# Patient Record
Sex: MALE | Race: WHITE
[De-identification: names, ages, dates, MRNs, and addresses within clinical notes are randomized per-mention and may not be internally consistent; named-entity substitution may affect disease eponyms.]

---

## 2021-02-11 ENCOUNTER — HOSPITAL ENCOUNTER (EMERGENCY)
Dept: HOSPITAL 33 - ED | Age: 61
Discharge: HOME | End: 2021-02-11
Payer: COMMERCIAL

## 2021-02-11 VITALS — SYSTOLIC BLOOD PRESSURE: 147 MMHG | HEART RATE: 66 BPM | DIASTOLIC BLOOD PRESSURE: 95 MMHG

## 2021-02-11 VITALS — OXYGEN SATURATION: 96 %

## 2021-02-11 DIAGNOSIS — I25.10: ICD-10-CM

## 2021-02-11 DIAGNOSIS — Z79.899: ICD-10-CM

## 2021-02-11 DIAGNOSIS — E78.5: ICD-10-CM

## 2021-02-11 DIAGNOSIS — I25.2: ICD-10-CM

## 2021-02-11 DIAGNOSIS — R55: Primary | ICD-10-CM

## 2021-02-11 DIAGNOSIS — I10: ICD-10-CM

## 2021-02-11 DIAGNOSIS — F41.9: ICD-10-CM

## 2021-02-11 LAB
ALBUMIN SERPL-MCNC: 3.9 G/DL (ref 3.5–5)
ALP SERPL-CCNC: 87 U/L (ref 38–126)
ALT SERPL-CCNC: 28 U/L (ref 0–50)
ANION GAP SERPL CALC-SCNC: 12.8 MEQ/L (ref 5–15)
AST SERPL QL: 24 U/L (ref 17–59)
BASOPHILS # BLD AUTO: 0.07 10*3/UL (ref 0–0.4)
BASOPHILS NFR BLD AUTO: 1 % (ref 0–0.4)
BILIRUB BLD-MCNC: 0.6 MG/DL (ref 0.2–1.3)
BUN SERPL-MCNC: 20 MG/DL (ref 9–20)
CALCIUM SPEC-MCNC: 9.5 MG/DL (ref 8.4–10.2)
CHLORIDE SERPL-SCNC: 102 MMOL/L (ref 98–107)
CO2 SERPL-SCNC: 25 MMOL/L (ref 22–30)
CREAT SERPL-MCNC: 0.6 MG/DL (ref 0.66–1.25)
EOSINOPHIL # BLD AUTO: 0.21 10*3/UL (ref 0–0.5)
GFR SERPLBLD BASED ON 1.73 SQ M-ARVRAT: > 60 ML/MIN
GLUCOSE SERPL-MCNC: 120 MG/DL (ref 74–106)
GLUCOSE UR-MCNC: NEGATIVE MG/DL
HCT VFR BLD AUTO: 38.5 % (ref 42–50)
HGB BLD-MCNC: 12.4 GM/DL (ref 12.5–18)
LYMPHOCYTES # SPEC AUTO: 1.48 10*3/UL (ref 1–4.6)
MAGNESIUM SERPL-MCNC: 2.1 MG/DL (ref 1.6–2.3)
MCH RBC QN AUTO: 31 PG (ref 26–32)
MCHC RBC AUTO-ENTMCNC: 32.2 G/DL (ref 32–36)
MONOCYTES # BLD AUTO: 0.49 10*3/UL (ref 0–1.3)
PLATELET # BLD AUTO: 371 K/MM3 (ref 150–450)
POTASSIUM SERPLBLD-SCNC: 3.9 MMOL/L (ref 3.5–5.1)
PROT SERPL-MCNC: 6.7 G/DL (ref 6.3–8.2)
PROT UR STRIP-MCNC: NEGATIVE MG/DL
RBC # BLD AUTO: 4 M/MM3 (ref 4.1–5.6)
RBC #/AREA URNS HPF: (no result) /HPF (ref 0–2)
SODIUM SERPL-SCNC: 137 MMOL/L (ref 137–145)
WBC # BLD AUTO: 7.2 K/MM3 (ref 4–10.5)
WBC #/AREA URNS HPF: (no result) /HPF (ref 0–5)

## 2021-02-11 PROCEDURE — 84484 ASSAY OF TROPONIN QUANT: CPT

## 2021-02-11 PROCEDURE — 85025 COMPLETE CBC W/AUTO DIFF WBC: CPT

## 2021-02-11 PROCEDURE — 81001 URINALYSIS AUTO W/SCOPE: CPT

## 2021-02-11 PROCEDURE — 36415 COLL VENOUS BLD VENIPUNCTURE: CPT

## 2021-02-11 PROCEDURE — 71045 X-RAY EXAM CHEST 1 VIEW: CPT

## 2021-02-11 PROCEDURE — 93041 RHYTHM ECG TRACING: CPT

## 2021-02-11 PROCEDURE — 96360 HYDRATION IV INFUSION INIT: CPT

## 2021-02-11 PROCEDURE — 93005 ELECTROCARDIOGRAM TRACING: CPT

## 2021-02-11 PROCEDURE — 99284 EMERGENCY DEPT VISIT MOD MDM: CPT

## 2021-02-11 PROCEDURE — 80053 COMPREHEN METABOLIC PANEL: CPT

## 2021-02-11 PROCEDURE — 96361 HYDRATE IV INFUSION ADD-ON: CPT

## 2021-02-11 PROCEDURE — 83735 ASSAY OF MAGNESIUM: CPT

## 2021-02-11 PROCEDURE — 36000 PLACE NEEDLE IN VEIN: CPT

## 2021-02-11 PROCEDURE — 94760 N-INVAS EAR/PLS OXIMETRY 1: CPT

## 2021-02-11 NOTE — ERPHSYRPT
- History of Present Illness


Time Seen by Provider: 02/11/21 10:59


Source: patient


Patient Subjective Stated Complaint: .


Triage Nursing Assessment: .


Physician History: 





Patient is a 61-year-old male presents to our ED from our physical therapy d

epartment.  Patient is status post right total knee replacement.  He was 

undergoing flexion range of motion exercises of his surgical knee when symptoms 

occurred.  However just prior to the episode patient was advised that his range 

of motion did not improve he would have to go into surgery for surgical 

manipulation of his knee.  At that point patient began to feel lightheaded as he

was concerned for the possibility of surgery.  Patient states that emotional 

stress can sometimes trigger a syncopal episode.  Patient began to feel 

lightheaded.  When he awoke staff was around him.  Per report patient was 

unconscious for about 15 seconds.  Upon awakening patient was at his baseline 

neurologically.  No associated nausea or vomiting.  No chest pain or shortness 

of breath.  Patient states he has had syncopal episodes in the past.  He 

believes a stress from physical therapy caused him to syncopized.  He is 

currently asymptomatic.  Vitals are stable.  Patient voices no other complaints 

or concerns at this time.


Witnessed: bystander


Prior Episodes: single episode today


Timing/Duration: other (Symptoms/syncope lasted 15 seconds.)


Precipitating Factors: other (Patient was undergoing physical therapy to his 

right knee and was in severe pain when symptoms occurred.)


Loss of Consciousness: brief (seconds)


Charcter of event(s): became unresponsive


Allergies/Adverse Reactions: 








morphine Adverse Reaction (Verified 02/11/21 11:06)


   


Sulfa (Sulfonamide Antibiotics) Adverse Reaction (Verified 02/11/21 11:06)


   





Home Medications: 








Aspirin 81 mg PO DAILY 02/11/21 [History]


Chlorthalidone 25 mg PO DAILY 02/11/21 [History]


Losartan Potassium 100 mg PO DAILY 02/11/21 [History]


Metoprolol Succinate 25 mg PO DAILY 02/11/21 [History]


Omeprazole 40 mg PO DAILY 02/11/21 [History]


Sertraline HCl 100 mg PO DAILY 02/11/21 [History]





Hx Tetanus, Diphtheria Vaccination/Date Given: No


Hx Influenza Vaccination/Date Given: No


Hx Pneumococcal Vaccination/Date Given: No


Immunizations Up to Date: No





Travel Risk





- International Travel


Have you traveled outside of the country in past 3 weeks: No





- Coronavirus Screening


Are you exhibiting any of the following symptoms?: No


Close contact with a COVID-19 positive Pt in past 14-21 Days: No





- Past Medical History


Pertinent Past Medical History: Yes


Neurological History: No Pertinent History


Cardiac History: Coronary Artery Disease, High Cholesterol, Hypertension, 

Myocardial Infarction (MI)


Respiratory History: No Pertinent History, Sleep Apnea


Endocrine Medical History: No Pertinent History


Musculoskeletal History: Osteoarthritis


Psycho-Social History: Anxiety


Other Medical History: PATIENT REPORTS OSTEOARTHRITIS THROUGHOUT ESPECIALLY 

HANDS AND LEFT FOOT. ACL REPAIR LEFT IN THE 90S. LEFT CARPEL TUNNEL RELEASE 

LEFT.  CANCER LARYNX 2012 WITH SURGERY AND RADIATION 2013.  HX ANXIETY, sleep 

apnea wears cpap





- Past Surgical History


Past Surgical History: Yes


Cardiac: Cardiac Catheterization


Gastrointestinal: Hernia Repair


Musculoskeletal: Orthopedic Surgery


Other Surgical History: right knee x4, acl left knee, carpal tunnel,





- Social History


Smoking Status: Former smoker


Exposure to second hand smoke: No


Drug Use: none


Patient Lives Alone: No





- Review of Systems


Constitutional: No Symptoms, No Fever, No Chills


Eyes: No Symptoms


Ears, Nose, & Throat: No Symptoms


Respiratory: No Symptoms, No Cough, No Dyspnea


Cardiac: No Symptoms, No Chest Pain, No Edema, No Syncope


Abdominal/Gastrointestinal: No Symptoms, No Abdominal Pain, No Nausea, No 

Vomiting, No Diarrhea


Genitourinary Symptoms: No Symptoms, No Dysuria


Musculoskeletal: No Symptoms, No Back Pain, No Neck Pain


Skin: No Symptoms, No Rash


Neurological: No Symptoms, No Dizziness, No Focal Weakness, No Sensory Changes


Psychological: No Symptoms


Endocrine: No Symptoms


Hematologic/Lymphatic: No Symptoms


Immunological/Allergic: No Symptoms


All Other Systems: Reviewed and Negative





Physical Exam





- Nursing Vital Signs


Nursing Vital Signs: 


                               Initial Vital Signs











Pulse Rate  69   02/11/21 10:56


 


Respiratory Rate  22   02/11/21 10:56


 


Blood Pressure  118/88   02/11/21 10:56


 


O2 Sat by Pulse Oximetry  97   02/11/21 10:56








                                   Pain Scale











Pain Intensity                 0

















- Beryl Coma Scale


Best Eye Response (Lima): (4) open spontaneously


Best Verbal Response (Lima): (5) oriented


Best Motor Response (Beryl): (6) obeys commands


Beryl Total: 15





- Physical Exam


General Appearance: no apparent distress, alert


Eye Exam: bilateral eye: normal inspection, PERRL, EOMI


Ears, Nose, Throat Exam: normal ENT inspection, pharynx normal, moist mucous 

membranes


Neck Exam: normal inspection, non-tender, supple, full range of motion


Respiratory: normal breath sounds, lungs clear, No chest tenderness, No 

respiratory distress


Cardiovascular: regular rate/rhythm, capillary refill <2 sec, No murmur, No 

pulse deficit


Gastrointestinal: soft, No tenderness, No distention, No mass


Back Exam: normal inspection, normal range of motion, No CVA tenderness, No 

vertebral tenderness


Extremity Exam: normal inspection, normal range of motion, pelvis stable, No 

tenderness


Peripheral Pulses: femoral (R): 2+, femoral (L): 2+, dorsalis-pedis (R): 2+, 

dorsalis-pedis (L): 2+


Mental Status: alert, oriented x 3, cooperative, No agitated, No uncooperative


CNs Exam: normal hearing, normal speech, PERRL, No abnormal eye position, No 

facial droop


Coordination/Gait: normal finger to nose, normal cerebellar function


Motor/Sensory: no motor deficit, no sensory deficit, no pronator drift


Skin Exam: normal color, warm, dry, No rash


**SpO2 Interpretation**: normal


SpO2: 96


O2 Delivery: Room Air





- Course


Nursing assessment & vital signs reviewed: Yes


EKG Interpreted by Me: RATE ([id number])


Ordered Tests: 


                               Active Orders 24 hr











 Category Date Time Status


 


 Cardiac Monitor STAT Care  02/11/21 11:01 Active


 


 EKG-ER Only STAT Care  02/11/21 11:01 Active


 


 IV Insertion STAT Care  02/11/21 11:01 Active


 


 Pulse Oximetry (ED) STAT Care  02/11/21 11:01 Active


 


 CHEST 1 VIEW (PORTABLE) Stat Exams  02/11/21 11:01 Completed


 


 CBC W DIFF Stat Lab  02/11/21 11:01 Completed


 


 CMP Stat Lab  02/11/21 11:15 Completed


 


 MAGNESIUM Stat Lab  02/11/21 11:15 Completed


 


 TROPONIN Q3H Lab  02/11/21 11:15 Completed


 


 TROPONIN Q3H Lab  02/11/21 14:18 Completed


 


 TROPONIN Q3H Lab  02/11/21 17:15 Ordered


 


 TROPONIN Q3H Lab  02/11/21 20:15 Ordered


 


 TROPONIN Q3H Lab  02/11/21 23:15 Ordered


 


 UA W/RFX UR CULTURE Stat Lab  02/11/21 11:01 Completed








Medication Summary











Generic Name Dose Route Start Last Admin





  Trade Name Freq  PRN Reason Stop Dose Admin


 


Sodium Chloride  1,000 mls @ 100 mls/hr  02/11/21 11:15  02/11/21 11:35





  Sodium Chloride 0.9% 1000 Ml  IV  03/13/21 11:14  100 mls/hr





  .Q10H CHAITANYA   Administration











Lab/Rad Data: 


                           Laboratory Result Diagrams





                                 02/11/21 11:01 





                                 02/11/21 11:15 





                               Laboratory Results











  02/11/21 02/11/21 02/11/21 Range/Units





  14:18 11:15 11:15 


 


WBC     (4.0-10.5)  K/mm3


 


RBC     (4.1-5.6)  M/mm3


 


Hgb     (12.5-18.0)  gm/dl


 


Hct     (42-50)  %


 


MCV     ()  fl


 


MCH     (26-32)  pg


 


MCHC     (32-36)  g/dl


 


RDW     (11.5-14.0)  %


 


Plt Count     (150-450)  K/mm3


 


MPV     (7.5-11.0)  fl


 


Gran %     (36.0-66.0)  %


 


Eos # (Auto)     (0-0.5)  


 


Absolute Lymphs (auto)     (1.0-4.6)  


 


Absolute Monos (auto)     (0.0-1.3)  


 


Lymphocytes %     (24.0-44.0)  %


 


Monocytes %     (0.0-12.0)  %


 


Eosinophils %     (0.00-5.0)  %


 


Basophils %     (0.0-0.4)  %


 


Absolute Granulocytes     (1.4-6.9)  


 


Basophils #     (0-0.4)  


 


Sodium    137  (137-145)  mmol/L


 


Potassium    3.9  (3.5-5.1)  mmol/L


 


Chloride    102  ()  mmol/L


 


Carbon Dioxide    25  (22-30)  mmol/L


 


Anion Gap    12.8  (5-15)  MEQ/L


 


BUN    20  (9-20)  mg/dL


 


Creatinine    0.60 L  (0.66-1.25)  mg/dL


 


Estimated GFR    > 60.0  ML/MIN


 


Glucose    120 H  ()  mg/dL


 


Calcium    9.5  (8.4-10.2)  mg/dL


 


Magnesium    2.1  (1.6-2.3)  mg/dL


 


Total Bilirubin    0.60  (0.2-1.3)  mg/dL


 


AST    24  (17-59)  U/L


 


ALT    28  (0-50)  U/L


 


Alkaline Phosphatase    87  ()  U/L


 


Troponin I  < 0.012  < 0.012   (0.000-0.034)  ng/mL


 


Serum Total Protein    6.7  (6.3-8.2)  g/dL


 


Albumin    3.9  (3.5-5.0)  g/dL


 


Urine Color     (YELLOW)  


 


Urine Appearance     (CLEAR)  


 


Urine pH     (5-6)  


 


Ur Specific Gravity     (1.005-1.025)  


 


Urine Protein     (Negative)  


 


Urine Ketones     (NEGATIVE)  


 


Urine Blood     (0-5)  Ranulfo/ul


 


Urine Nitrite     (NEGATIVE)  


 


Urine Bilirubin     (NEGATIVE)  


 


Urine Urobilinogen     (0-1)  mg/dL


 


Ur Leukocyte Esterase     (NEGATIVE)  


 


Urine WBC (Auto)     (0-5)  /HPF


 


Urine RBC (Auto)     (0-2)  /HPF


 


U Epithel Cells (Auto)     (FEW)  /HPF


 


Urine Bacteria (Auto)     (NEGATIVE)  /HPF


 


Granular Casts (Auto)     (NEGATIVE)  /LPF


 


Urine Mucus (Auto)     (NEGATIVE)  /HPF


 


Urine Culture Reflexed     (NO)  


 


Urine Glucose     (NEGATIVE)  mg/dL














  02/11/21 02/11/21 Range/Units





  11:01 11:01 


 


WBC  7.2   (4.0-10.5)  K/mm3


 


RBC  4.00 L   (4.1-5.6)  M/mm3


 


Hgb  12.4 L   (12.5-18.0)  gm/dl


 


Hct  38.5 L   (42-50)  %


 


MCV  96.3   ()  fl


 


MCH  31.0   (26-32)  pg


 


MCHC  32.2   (32-36)  g/dl


 


RDW  12.8   (11.5-14.0)  %


 


Plt Count  371   (150-450)  K/mm3


 


MPV  9.6   (7.5-11.0)  fl


 


Gran %  68.9 H   (36.0-66.0)  %


 


Eos # (Auto)  0.21   (0-0.5)  


 


Absolute Lymphs (auto)  1.48   (1.0-4.6)  


 


Absolute Monos (auto)  0.49   (0.0-1.3)  


 


Lymphocytes %  20.4 L   (24.0-44.0)  %


 


Monocytes %  6.8   (0.0-12.0)  %


 


Eosinophils %  2.9   (0.00-5.0)  %


 


Basophils %  1.0   (0.0-0.4)  %


 


Absolute Granulocytes  4.99   (1.4-6.9)  


 


Basophils #  0.07   (0-0.4)  


 


Sodium    (137-145)  mmol/L


 


Potassium    (3.5-5.1)  mmol/L


 


Chloride    ()  mmol/L


 


Carbon Dioxide    (22-30)  mmol/L


 


Anion Gap    (5-15)  MEQ/L


 


BUN    (9-20)  mg/dL


 


Creatinine    (0.66-1.25)  mg/dL


 


Estimated GFR    ML/MIN


 


Glucose    ()  mg/dL


 


Calcium    (8.4-10.2)  mg/dL


 


Magnesium    (1.6-2.3)  mg/dL


 


Total Bilirubin    (0.2-1.3)  mg/dL


 


AST    (17-59)  U/L


 


ALT    (0-50)  U/L


 


Alkaline Phosphatase    ()  U/L


 


Troponin I    (0.000-0.034)  ng/mL


 


Serum Total Protein    (6.3-8.2)  g/dL


 


Albumin    (3.5-5.0)  g/dL


 


Urine Color   YELLOW  (YELLOW)  


 


Urine Appearance   SLIGHTLY CLOUDY  (CLEAR)  


 


Urine pH   6.0  (5-6)  


 


Ur Specific Gravity   1.021  (1.005-1.025)  


 


Urine Protein   NEGATIVE  (Negative)  


 


Urine Ketones   NEGATIVE  (NEGATIVE)  


 


Urine Blood   NEGATIVE  (0-5)  Ranulfo/ul


 


Urine Nitrite   NEGATIVE  (NEGATIVE)  


 


Urine Bilirubin   NEGATIVE  (NEGATIVE)  


 


Urine Urobilinogen   2  (0-1)  mg/dL


 


Ur Leukocyte Esterase   NEGATIVE  (NEGATIVE)  


 


Urine WBC (Auto)   NONE  (0-5)  /HPF


 


Urine RBC (Auto)   NONE  (0-2)  /HPF


 


U Epithel Cells (Auto)   NONE  (FEW)  /HPF


 


Urine Bacteria (Auto)   NONE SEEN  (NEGATIVE)  /HPF


 


Granular Casts (Auto)   5-10  (NEGATIVE)  /LPF


 


Urine Mucus (Auto)   SLIGHT  (NEGATIVE)  /HPF


 


Urine Culture Reflexed   NO  (NO)  


 


Urine Glucose   NEGATIVE  (NEGATIVE)  mg/dL














- Progress


Progress: improved


Progress Note: 





02/11/21 14:59


Patient reassessed.  He remains asymptomatic.  Repeat neuro exam within normal 

limits.  Initial neuro exam within normal limits.  NIH score is 0.  Troponin 

negative x2.  Chest x-ray negative.  No indication for further evaluation or 

treatment at this time.  Patient has a history of vasovagal syncope.  It appears

 that today's episode was a recurrence of his usual vasovagal syncope.  Patient 

ambulated in our ED.  He was completely asymptomatic.  Gait was steady and 

normal will discharge at this time.  Patient agrees to follow-up with his 

primary care doctor within 48 hours for reevaluation.  Patient voices no other 

complaints or concerns at this time.


02/11/21 15:02





Counseled pt/family regarding: lab results, diagnosis, need for follow-up, rad 

results





- Departure


Departure Disposition: Home


Clinical Impression: 


 Vasovagal syncope





Condition: Stable


Critical Care Time: No


Referrals: 


HUMA PACHECO [Primary Care Provider] - 


Additional Instructions: 


Discharge/Care Plan





CHAR HERRERA was seen on 02/11/21 in the Emergency Room. The patient was 

counseled regarding Diagnosis,Lab results, Imaging studies, need for follow up 

and when to return to the Emergency Room.





Prescriptions given:





Discharge Note





I have spoken with the patient and/or caregivers. I have explained the patient's

condition, diagnosis and treatment plan based on the information available to me

at this time. I have answered the patient's and/or caregiver's questions and 

addressed any concerns. The patient and/or caregivers have as good understanding

of the patient's diagnosis, condition and treatment plan as can be expected at 

this point. The vital signs have been stable. The patient's condition is stable 

and appropriate for discharge from the emergency department.





The patient will pursue further outpatient evaluation with the primary care 

physician or other designated or consulting physician as outlined in the 

discharge instructions. The patient and/or caregivers are agreeable to this plan

of care and follow-up instructions have been explained in detail. The patient 

and/or caregivers have received these instruction. The patient/and or caregivers

are aware that any significant change in condition or worsening of symptoms 

should prompt an immediate return to this or the closest emergency department or

call 911.

## 2021-02-11 NOTE — XRAY
Indication: Syncope.



Comparison: December 23, 2008.



Portable chest remains hyperinflated and clear.  Heart is not enlarged again

with tortuous descending aorta.  Bony thorax intact again with mild

degenerative changes.



Impression: Continued nonacute hyperinflated chest with chronic features.

## 2022-12-17 ENCOUNTER — HOSPITAL ENCOUNTER (EMERGENCY)
Dept: HOSPITAL 33 - ED | Age: 62
LOS: 1 days | Discharge: HOME | End: 2022-12-18
Payer: COMMERCIAL

## 2022-12-17 DIAGNOSIS — Z79.899: ICD-10-CM

## 2022-12-17 DIAGNOSIS — E78.5: ICD-10-CM

## 2022-12-17 DIAGNOSIS — R09.81: ICD-10-CM

## 2022-12-17 DIAGNOSIS — R05.1: ICD-10-CM

## 2022-12-17 DIAGNOSIS — R53.1: ICD-10-CM

## 2022-12-17 DIAGNOSIS — J10.1: Primary | ICD-10-CM

## 2022-12-17 DIAGNOSIS — R55: ICD-10-CM

## 2022-12-17 DIAGNOSIS — I10: ICD-10-CM

## 2022-12-17 PROCEDURE — 93041 RHYTHM ECG TRACING: CPT

## 2022-12-17 PROCEDURE — 93005 ELECTROCARDIOGRAM TRACING: CPT

## 2022-12-17 PROCEDURE — 81015 MICROSCOPIC EXAM OF URINE: CPT

## 2022-12-17 PROCEDURE — 36000 PLACE NEEDLE IN VEIN: CPT

## 2022-12-17 PROCEDURE — 83605 ASSAY OF LACTIC ACID: CPT

## 2022-12-17 PROCEDURE — 85025 COMPLETE CBC W/AUTO DIFF WBC: CPT

## 2022-12-17 PROCEDURE — 80053 COMPREHEN METABOLIC PANEL: CPT

## 2022-12-17 PROCEDURE — 96360 HYDRATION IV INFUSION INIT: CPT

## 2022-12-17 PROCEDURE — 84484 ASSAY OF TROPONIN QUANT: CPT

## 2022-12-17 PROCEDURE — 83735 ASSAY OF MAGNESIUM: CPT

## 2022-12-17 PROCEDURE — 0241U: CPT

## 2022-12-17 PROCEDURE — 87040 BLOOD CULTURE FOR BACTERIA: CPT

## 2022-12-17 PROCEDURE — 84145 PROCALCITONIN (PCT): CPT

## 2022-12-17 PROCEDURE — 87651 STREP A DNA AMP PROBE: CPT

## 2022-12-17 PROCEDURE — 83880 ASSAY OF NATRIURETIC PEPTIDE: CPT

## 2022-12-17 PROCEDURE — 71045 X-RAY EXAM CHEST 1 VIEW: CPT

## 2022-12-17 PROCEDURE — 70450 CT HEAD/BRAIN W/O DYE: CPT

## 2022-12-17 PROCEDURE — 99284 EMERGENCY DEPT VISIT MOD MDM: CPT

## 2022-12-17 PROCEDURE — 36415 COLL VENOUS BLD VENIPUNCTURE: CPT

## 2022-12-17 NOTE — ERPHSYRPT
- History of Present Illness


Time Seen by Provider: 12/17/22 22:30


Source: patient


Exam Limitations: no limitations


Patient Subjective Stated Complaint: chills, "feeling feverish", cough, scratchy

throat and syncope


Triage Nursing Assessment: pt brought in by ambulance.  Pt c/o "not feeling well

since early this morning around 2am".  Pt c/o chills, "feeling feverish", cough,

scratchy throat, and syncopal episode at home, hit his head on the wall beside 

their sunroom, and had loc for a few seconds.  Pt is able to move all 

extremities without diff,  strong and equal, speech is clear.  Lungs clear,

heart tones regular.  Abd soft with active bs x4 quad, nontender, c/o gerd.  Pt 

denies any c/p, sob.


Physician History: 





62 years old male with history of hypertension, hyperlipidemia presented in the 

ER with chief complaint of flulike symptoms since yesterday with cough 

congestion with generalized weakness fatigue and tiredness.  Subjective feeling 

of fever and chills.  Patient reports he was walking, feeling lightheaded and 

was on his knees and passed out.  He did hit his head against the wall.  No 

seizure-like activity noticed.


Timing/Duration: yesterday, gradual onset, worse


Cough Quality/Degree: mild, moderate, dry cough, productive cough


Possible Cause: unknown cause


Modifying Factors: Worsens With: coughing, exertion


Associated Symptoms: fever, chills, cough, muscle aches, nasal congestion, nasal

drainage, sinus infection, sore throat


Allergies/Adverse Reactions: 








morphine Adverse Reaction (Verified 12/17/22 22:45)


   


Sulfa (Sulfonamide Antibiotics) Adverse Reaction (Verified 12/17/22 22:45)


   





Home Medications: 








Chlorthalidone 12.5 mg PO DAILY 02/11/21 [History]


Losartan Potassium 100 mg PO DAILY 02/11/21 [History]


Metoprolol Succinate 50 mg PO DAILY 02/11/21 [History]


Omeprazole 40 mg PO DAILY 02/11/21 [History]


Sertraline HCl 100 mg PO DAILY 02/11/21 [History]


Amlodipine Besylate 5 mg*** [Norvasc 5 mg***] 5 mg PO HS 12/17/22 [History]


Atorvastatin Calcium 10 mg PO HS 12/17/22 [History]


Celecoxib 100 mg** [celeBREX 100 MG**] 200 mg PO DAILY 12/17/22 [History]


Cetirizine HCl [Zyrtec] 10 mg PO DAILY 12/17/22 [History]





Hx Tetanus, Diphtheria Vaccination/Date Given: No


Hx Influenza Vaccination/Date Given: No


Hx Pneumococcal Vaccination/Date Given: No


Immunizations Up to Date: No





Travel Risk





- International Travel


Have you traveled outside of the country in past 3 weeks: No





- Coronavirus Screening


Are you exhibiting any of the following symptoms?: Yes


Symptoms: Cough: New Onset, Headaches/Body Aches/Fatigue


Close contact with a COVID-19 positive Pt in past 14-21 Days: No





- Vaccine Status


Have you recieved a Covid-19 vaccination: Yes


: Moderna





- Vaccination Dates


Date of 2cond Vaccination (if applicable): .





- Review of Systems


Constitutional: Fever, Chills, Fatigue, Weakness


Eyes: No Symptoms


Ears, Nose, & Throat: Nose Congestion, Throat Pain


Respiratory: Cough


Cardiac: No Symptoms


Abdominal/Gastrointestinal: No Symptoms


Genitourinary Symptoms: No Symptoms


Musculoskeletal: Myalgias


Skin: No Symptoms


Neurological: Dizziness, No Focal Weakness


Psychological: No Symptoms


Endocrine: No Symptoms


Hematologic/Lymphatic: No Symptoms


Immunological/Allergic: No Symptoms





- Past Medical History


Pertinent Past Medical History: Yes


Neurological History: No Pertinent History


ENT History: No Pertinent History


Cardiac History: Coronary Artery Disease, High Cholesterol, Hypertension, 

Myocardial Infarction (MI)


Respiratory History: No Pertinent History, Sleep Apnea


Endocrine Medical History: No Pertinent History


Musculoskeletal History: Osteoarthritis


GI Medical History: GERD


Psycho-Social History: Anxiety


Other Medical History: PATIENT REPORTS OSTEOARTHRITIS THROUGHOUT ESPECIALLY 

HANDS AND LEFT FOOT. ACL REPAIR LEFT IN THE 90S. LEFT CARPEL TUNNEL RELEASE 

LEFT.  CANCER LARYNX 2012 WITH SURGERY AND RADIATION 2013.  HX ANXIETY, sleep 

apnea wears cpap





- Past Surgical History


Past Surgical History: Yes


Neuro Surgical History: No Pertinent History


Cardiac: Cardiac Catheterization


Gastrointestinal: Hernia Repair


Musculoskeletal: Orthopedic Surgery


Other Surgical History: right knee x4, acl left knee, carpal tunnel,





- Social History


Smoking Status: Former smoker


Exposure to second hand smoke: No


Drug Use: none


Patient Lives Alone: No





- Nursing Vital Signs


Nursing Vital Signs: 


                               Initial Vital Signs











Temperature  98.3 F   12/17/22 22:29


 


Pulse Rate  78   12/17/22 22:29


 


Respiratory Rate  18   12/17/22 22:29


 


Blood Pressure  110/60   12/17/22 22:29


 


O2 Sat by Pulse Oximetry  93 L  12/17/22 22:29








                                   Pain Scale











Pain Intensity                 2

















- Physical Exam


General Appearance: no apparent distress, alert


Eye Exam: PERRL/EOMI


Ears, Nose, Throat Exam: normal ENT inspection, TMs normal, pharyngeal erythema


Neck Exam: normal inspection, non-tender, supple, full range of motion


Respiratory Exam: normal breath sounds, lungs clear


Cardiovascular Exam: regular rate/rhythm, normal heart sounds


Gastrointestinal/Abdomen Exam: soft, normal bowel sounds, No tenderness


Back Exam: normal inspection, normal range of motion


Extremity Exam: normal inspection, normal range of motion


Neurologic Exam: alert, oriented x 3, cooperative, CNs II-XII nml as tested, 

normal mood/affect, nml cerebellar function, sensation nml


Skin Exam: normal color


**SpO2 Interpretation**: normal


SpO2: 93


O2 Delivery: Room Air





- Course


EKG Interpreted by Me: RATE ([id number]), Sinus Rhythm, NORMAL AXIS, NORMAL INTERVALS, 

Non-specific ST Changes


Ordered Tests: 


                               Active Orders 24 hr











 Category Date Time Status


 


 Cardiac Monitor STAT Care  12/17/22 22:44 Active


 


 EKG-ER Only STAT Care  12/17/22 22:43 Active


 


 IV Insertion STAT Care  12/17/22 22:43 Active


 


 Orthostatic Vital Signs STAT Care  12/17/22 22:43 Active


 


 CHEST 1 VIEW (PORTABLE) Stat Exams  12/17/22 22:43 Taken


 


 HEAD WITHOUT CONTRAST [CT] Stat Exams  12/17/22 22:43 Taken


 


 CBC W DIFF Stat Lab  12/17/22 11:53 Completed


 


 CMP Stat Lab  12/17/22 11:53 Completed


 


 Lactic Acid Stat Lab  12/17/22 22:45 Completed


 


 MAG [MAGNESIUM] Stat Lab  12/17/22 11:53 Completed


 


 NT PRO BNP Stat Lab  12/17/22 11:53 Completed


 


 PROCALCITONIN Stat Lab  12/17/22 11:53 Completed


 


 TROPONIN Q4H Lab  12/17/22 11:53 Completed


 


 TROPONIN Q4H Lab  12/18/22 02:45 Ordered


 


 TROPONIN Q4H Lab  12/18/22 06:45 Ordered


 


 UA W/RFX CULTURE Stat Lab  12/18/22 01:01 Ordered








Medication Summary














Discontinued Medications














Generic Name Dose Route Start Last Admin





  Trade Name Freq  PRN Reason Stop Dose Admin


 


Sodium Chloride  1,000 mls @ 999 mls/hr  12/17/22 22:43  12/17/22 23:59





  Sodium Chloride 0.9% 1000 Ml  IV  12/17/22 23:43  Infused





  .Q1H1M STA   Infusion


 


Sodium Chloride  Confirm  12/17/22 22:57 





  Sodium Chloride 0.9% 1000 Ml  Administered  12/17/22 22:58 





  Dose  





  1,000 mls @ ud  





  .ROUTE  





  .STK-MED ONE  


 


Oseltamivir Phosphate  75 mg  12/18/22 00:50  12/18/22 00:55





  Oseltamivir 75 Mg Cap  PO  12/18/22 00:51  75 mg





  STAT ONE   Administration


 


Oseltamivir Phosphate  Confirm  12/18/22 00:54 





  Oseltamivir 75 Mg Cap  Administered  12/18/22 00:55 





  Dose  





  75 mg  





  PO  





  .STK-MED ONE  











Lab/Rad Data: 


                           Laboratory Result Diagrams





                                 12/17/22 11:53 





                                 12/17/22 11:53 





                               Laboratory Results











  12/18/22 12/17/22 12/17/22 Range/Units





  00:03 11:53 11:53 


 


WBC     (4.0-10.5)  x10^3/uL


 


RBC     (4.1-5.6)  x10^6/uL


 


Hgb     (12.5-18.0)  g/dL


 


Hct     (42-50)  %


 


MCV     ()  fL


 


MCH     (26-32)  pg


 


MCHC     (32-36)  g/dL


 


RDW     (11.5-14.0)  %


 


Plt Count     (150-450)  x10^3/uL


 


MPV     (7.5-11.0)  fL


 


Gran %     (36.0-66.0)  %


 


Immature Gran % (Auto)     (0.00-0.4)  %


 


Nucleat RBC Rel Count     (0.00-0.1)  %


 


Eos # (Auto)     (0-0.5)  x10^3/uL


 


Immature Gran # (Auto)     (0.00-0.03)  x10^3u/L


 


Absolute Lymphs (auto)     (1.0-4.6)  x10^3/uL


 


Absolute Monos (auto)     (0.0-1.3)  x10^3/uL


 


Absolute Nucleated RBC     (0.00-0.01)  x10^3u/L


 


Lymphocytes %     (24.0-44.0)  %


 


Monocytes %     (0.0-12.0)  %


 


Eosinophils %     (0.00-5.0)  %


 


Basophils %     (0.0-0.4)  %


 


Absolute Granulocytes     (1.4-6.9)  x10^3/uL


 


Basophils #     (0-0.4)  x10^3/uL


 


Sodium     (137-145)  mmol/L


 


Potassium     (3.5-5.1)  mmol/L


 


Chloride     ()  mmol/L


 


Carbon Dioxide     (22-30)  mmol/L


 


Anion Gap     (5-15)  MEQ/L


 


BUN     (9-20)  mg/dL


 


Creatinine     (0.66-1.25)  mg/dL


 


Estimated GFR     ML/MIN


 


Glucose     ()  mg/dL


 


Lactic Acid  1.1    (0.4-2.0)  


 


Calcium     (8.4-10.2)  mg/dL


 


Magnesium    1.6  (1.6-2.3)  mg/dL


 


Total Bilirubin     (0.2-1.3)  mg/dL


 


AST     (17-59)  U/L


 


ALT     (0-50)  U/L


 


Alkaline Phosphatase     ()  U/L


 


Troponin I    < 0.012  (0.000-0.034)  ng/mL


 


NT-Pro-B Natriuret Pep     (0-900)  pg/mL


 


Serum Total Protein     (6.3-8.2)  g/dL


 


Albumin     (3.5-5.0)  g/dL


 


Procalcitonin     (0.030-0.080)  ng/mL


 


Influenza Type A Ag   POSITIVE   (NEGATIVE)  


 


Influenza Type B Ag   NEGATIVE   (NEGATIVE)  


 


RSV (PCR)   NEGATIVE   (Negative)  


 


SARS-CoV-2 (PCR)   NEGATIVE   (NEGATIVE)  


 


Group A Strep Antibody   NEGATIVE   (NEGATIVE)  


 


Slides for Path Review     














  12/17/22 12/17/22 Range/Units





  11:53 11:53 


 


WBC   8.8  (4.0-10.5)  x10^3/uL


 


RBC   4.72  (4.1-5.6)  x10^6/uL


 


Hgb   14.8  (12.5-18.0)  g/dL


 


Hct   44.8  (42-50)  %


 


MCV   94.9  ()  fL


 


MCH   31.4  (26-32)  pg


 


MCHC   33.0  (32-36)  g/dL


 


RDW   11.9  (11.5-14.0)  %


 


Plt Count   202  (150-450)  x10^3/uL


 


MPV   10.2  (7.5-11.0)  fL


 


Gran %   88.7 H  (36.0-66.0)  %


 


Immature Gran % (Auto)   0.3  (0.00-0.4)  %


 


Nucleat RBC Rel Count   0.0  (0.00-0.1)  %


 


Eos # (Auto)   0.07  (0-0.5)  x10^3/uL


 


Immature Gran # (Auto)   0.03  (0.00-0.03)  x10^3u/L


 


Absolute Lymphs (auto)   0.30 L  (1.0-4.6)  x10^3/uL


 


Absolute Monos (auto)   0.55  (0.0-1.3)  x10^3/uL


 


Absolute Nucleated RBC   0.00  (0.00-0.01)  x10^3u/L


 


Lymphocytes %   3.4 L  (24.0-44.0)  %


 


Monocytes %   6.2  (0.0-12.0)  %


 


Eosinophils %   0.8  (0.00-5.0)  %


 


Basophils %   0.6  (0.0-0.4)  %


 


Absolute Granulocytes   7.81 H  (1.4-6.9)  x10^3/uL


 


Basophils #   0.05  (0-0.4)  x10^3/uL


 


Sodium  136 L   (137-145)  mmol/L


 


Potassium  3.5   (3.5-5.1)  mmol/L


 


Chloride  104   ()  mmol/L


 


Carbon Dioxide  27   (22-30)  mmol/L


 


Anion Gap  8.6   (5-15)  MEQ/L


 


BUN  17   (9-20)  mg/dL


 


Creatinine  0.78   (0.66-1.25)  mg/dL


 


Estimated GFR  > 60.0   ML/MIN


 


Glucose  110 H   ()  mg/dL


 


Lactic Acid    (0.4-2.0)  


 


Calcium  8.8   (8.4-10.2)  mg/dL


 


Magnesium    (1.6-2.3)  mg/dL


 


Total Bilirubin  0.90   (0.2-1.3)  mg/dL


 


AST  27   (17-59)  U/L


 


ALT  33   (0-50)  U/L


 


Alkaline Phosphatase  89   ()  U/L


 


Troponin I    (0.000-0.034)  ng/mL


 


NT-Pro-B Natriuret Pep  42.6   (0-900)  pg/mL


 


Serum Total Protein  7.0   (6.3-8.2)  g/dL


 


Albumin  4.1   (3.5-5.0)  g/dL


 


Procalcitonin  0.062   (0.030-0.080)  ng/mL


 


Influenza Type A Ag    (NEGATIVE)  


 


Influenza Type B Ag    (NEGATIVE)  


 


RSV (PCR)    (Negative)  


 


SARS-CoV-2 (PCR)    (NEGATIVE)  


 


Group A Strep Antibody    (NEGATIVE)  


 


Slides for Path Review   YES  














- Progress


Progress: improved


Air Movement: good


Progress Note: 





12/18/22 00:59


62-year-old is evaluated for flulike symptoms with syncope after feeling dizzy 

lightheaded and weak all over.  He did hit his head against the wall, obtain CT 

head and is negative for any acute findings.  Chest x-ray negative for any acute

 cardiopulmonary findings reviewed by me, official report is pending.  EKG did 

not show any acute ischemic changes and negative initial troponin.  Normal white

 count, fairly unremarkable chemistries.  Does have negative orthostatics.  

Given fluids and feeling much better on reevaluation.  Patient has a positive 

influenza A, started on Tamiflu.  Recommended to increase hydration and 

symptomatic treatment.  Discussed signs symptoms of worsening needing return to 

ER which she seems understanding


Blood Culture(s) Obtained: Yes


Antibiotics given: No


Counseled pt/family regarding: lab results, diagnosis, need for follow-up, rad 

results





- Departure


Departure Disposition: Home


Clinical Impression: 


 Influenza A, Syncope and collapse





Condition: Stable


Critical Care Time: No


Referrals: 


[facility] [NON-STAFF PHY W/O PRIVILEGES] - Follow up/PCP as directed (in 2 

days for reevaluation)


Instructions:  Flu, Adult (DC), Syncope (Fainting) (DC)


Additional Instructions: 


Take Tylenol/ibuprofen as needed for aches and pains/fever.  Keep yourself well-

hydrated.  Follow-up with primary care for reevaluation.  Return to ER for 

persistent high-grade fever, intractable vomiting, difficulty breathing etc.








Prescriptions: 


Oseltamivir 75 mg*** [Tamiflu 75MG Capsule***] 75 mg PO BID #10 cap

## 2022-12-18 VITALS — OXYGEN SATURATION: 94 % | DIASTOLIC BLOOD PRESSURE: 77 MMHG | HEART RATE: 80 BPM | SYSTOLIC BLOOD PRESSURE: 126 MMHG

## 2022-12-18 LAB
ALBUMIN SERPL-MCNC: 4.1 G/DL (ref 3.5–5)
ALP SERPL-CCNC: 89 U/L (ref 38–126)
ALT SERPL-CCNC: 33 U/L (ref 0–50)
ANION GAP SERPL CALC-SCNC: 8.6 MEQ/L (ref 5–15)
AST SERPL QL: 27 U/L (ref 17–59)
BASOPHILS # BLD AUTO: 0.05 X10^3/UL (ref 0–0.4)
BILIRUB BLD-MCNC: 0.9 MG/DL (ref 0.2–1.3)
BLD SMEAR INTERP: YES
BNP SERPL-MCNC: 42.6 PG/ML (ref 0–900)
BUN SERPL-MCNC: 17 MG/DL (ref 9–20)
CALCIUM SPEC-MCNC: 8.8 MG/DL (ref 8.4–10.2)
CHLORIDE SERPL-SCNC: 104 MMOL/L (ref 98–107)
CO2 SERPL-SCNC: 27 MMOL/L (ref 22–30)
CREAT SERPL-MCNC: 0.78 MG/DL (ref 0.66–1.25)
EOSINOPHIL # BLD AUTO: 0.07 X10^3/UL (ref 0–0.5)
FLUAV AG NPH QL IA: POSITIVE
FLUBV AG NPH QL IA: NEGATIVE
GFR SERPLBLD BASED ON 1.73 SQ M-ARVRAT: > 60 ML/MIN
GLUCOSE SERPL-MCNC: 110 MG/DL (ref 74–106)
GLUCOSE UR-MCNC: NEGATIVE MG/DL
HCT VFR BLD AUTO: 44.8 % (ref 42–50)
HGB BLD-MCNC: 14.8 G/DL (ref 12.5–18)
LYMPHOCYTES # SPEC AUTO: 0.3 X10^3/UL (ref 1–4.6)
MAGNESIUM SERPL-MCNC: 1.6 MG/DL (ref 1.6–2.3)
MCH RBC QN AUTO: 31.4 PG (ref 26–32)
MCHC RBC AUTO-ENTMCNC: 33 G/DL (ref 32–36)
MONOCYTES # BLD AUTO: 0.55 X10^3/UL (ref 0–1.3)
PLATELET # BLD AUTO: 202 X10^3/UL (ref 150–450)
POTASSIUM SERPLBLD-SCNC: 3.5 MMOL/L (ref 3.5–5.1)
PROCALCITONIN SERPL-MCNC: 0.06 NG/ML (ref 0.03–0.08)
PROT SERPL-MCNC: 7 G/DL (ref 6.3–8.2)
PROT UR STRIP-MCNC: 30 MG/DL
RBC # BLD AUTO: 4.72 X10^6/UL (ref 4.1–5.6)
RBC # UR AUTO: NEGATIVE ERY/UL (ref 0–5)
RBC #/AREA URNS HPF: (no result) /HPF (ref 0–2)
RSV AG SPEC QL IA: NEGATIVE
S PYO AG SPEC QL: NEGATIVE
SARS-COV-2 AG RESP QL IA.RAPID: NEGATIVE
SODIUM SERPL-SCNC: 136 MMOL/L (ref 137–145)
TROPONIN T SERPL HS-MCNC: < 0.012 NG/ML (ref 0–0.03)
UA DIPSTICK PNL UR: (no result)
URINE CULTURED INDICATED?: NO
WBC # BLD AUTO: 8.8 X10^3/UL (ref 4–10.5)
WBC #/AREA URNS HPF: (no result) /HPF (ref 0–5)

## 2022-12-18 NOTE — XRAY
Indication: Cough.



Comparison: February 11, 2021



Portable chest less inflated and clear again with a few incidental tiny

calcified granulomas.  Heart not enlarged.  Suspect small hiatal hernia.  Bony

thorax intact with mild osteopenia and degenerative changes.